# Patient Record
Sex: FEMALE | Race: AMERICAN INDIAN OR ALASKA NATIVE
[De-identification: names, ages, dates, MRNs, and addresses within clinical notes are randomized per-mention and may not be internally consistent; named-entity substitution may affect disease eponyms.]

---

## 2021-01-07 ENCOUNTER — HOSPITAL ENCOUNTER (EMERGENCY)
Dept: HOSPITAL 5 - ED | Age: 51
Discharge: HOME | End: 2021-01-07
Payer: COMMERCIAL

## 2021-01-07 VITALS — SYSTOLIC BLOOD PRESSURE: 117 MMHG | DIASTOLIC BLOOD PRESSURE: 61 MMHG

## 2021-01-07 DIAGNOSIS — R07.89: Primary | ICD-10-CM

## 2021-01-07 DIAGNOSIS — Z79.899: ICD-10-CM

## 2021-01-07 DIAGNOSIS — R51.9: ICD-10-CM

## 2021-01-07 DIAGNOSIS — I10: ICD-10-CM

## 2021-01-07 LAB
ALBUMIN SERPL-MCNC: 4.7 G/DL (ref 3.9–5)
ALT SERPL-CCNC: 28 UNITS/L (ref 7–56)
BASOPHILS # (AUTO): 0 K/MM3 (ref 0–0.1)
BASOPHILS NFR BLD AUTO: 0.9 % (ref 0–1.8)
BILIRUB UR QL STRIP: (no result)
BLOOD UR QL VISUAL: (no result)
BUN SERPL-MCNC: 12 MG/DL (ref 7–17)
BUN/CREAT SERPL: 17 %
CALCIUM SERPL-MCNC: 9.6 MG/DL (ref 8.4–10.2)
EOSINOPHIL # BLD AUTO: 0 K/MM3 (ref 0–0.4)
EOSINOPHIL NFR BLD AUTO: 0.5 % (ref 0–4.3)
HCT VFR BLD CALC: 39 % (ref 30.3–42.9)
HEMOLYSIS INDEX: 4
HGB BLD-MCNC: 13.3 GM/DL (ref 10.1–14.3)
LYMPHOCYTES # BLD AUTO: 1.2 K/MM3 (ref 1.2–5.4)
LYMPHOCYTES NFR BLD AUTO: 32.7 % (ref 13.4–35)
MCHC RBC AUTO-ENTMCNC: 34 % (ref 30–34)
MCV RBC AUTO: 94 FL (ref 79–97)
MONOCYTES # (AUTO): 0.2 K/MM3 (ref 0–0.8)
MONOCYTES % (AUTO): 5.5 % (ref 0–7.3)
MUCOUS THREADS #/AREA URNS HPF: (no result) /HPF
PH UR STRIP: 7 [PH] (ref 5–7)
PLATELET # BLD: 214 K/MM3 (ref 140–440)
PROT UR STRIP-MCNC: (no result) MG/DL
RBC # BLD AUTO: 4.14 M/MM3 (ref 3.65–5.03)
RBC #/AREA URNS HPF: 1 /HPF (ref 0–6)
UROBILINOGEN UR-MCNC: < 2 MG/DL (ref ?–2)
WBC #/AREA URNS HPF: 1 /HPF (ref 0–6)

## 2021-01-07 PROCEDURE — 71046 X-RAY EXAM CHEST 2 VIEWS: CPT

## 2021-01-07 PROCEDURE — 80053 COMPREHEN METABOLIC PANEL: CPT

## 2021-01-07 PROCEDURE — 81001 URINALYSIS AUTO W/SCOPE: CPT

## 2021-01-07 PROCEDURE — 93005 ELECTROCARDIOGRAM TRACING: CPT

## 2021-01-07 PROCEDURE — 84703 CHORIONIC GONADOTROPIN ASSAY: CPT

## 2021-01-07 PROCEDURE — 84484 ASSAY OF TROPONIN QUANT: CPT

## 2021-01-07 PROCEDURE — 36415 COLL VENOUS BLD VENIPUNCTURE: CPT

## 2021-01-07 PROCEDURE — 70450 CT HEAD/BRAIN W/O DYE: CPT

## 2021-01-07 PROCEDURE — 85025 COMPLETE CBC W/AUTO DIFF WBC: CPT

## 2021-01-07 NOTE — EMERGENCY DEPARTMENT REPORT
ED General Adult HPI





- General


Chief complaint: Chest Pain


Stated complaint: CHEST HEAVINESS; HEADACHE; HIGH BLOOD PRESSURE


Time Seen by Provider: 01/07/21 12:02


Source: patient


Mode of arrival: Ambulatory


Limitations: No Limitations





- History of Present Illness


Initial comments: 


Patient is 50 years old female with history of hypertension.  Patient is cu

rrently taking losartan/hydrochlorthiazide 160/25 milligrams.  Patient presented

to the ER with multiple complaints.  Patient stated that she is having headache 

since yesterday comes and goes.  She also reported that she is having chest pain

last night and this morning however it resolved now.  Patient denied any 

weakness, numbness or tingling sensation.  No neck pain, bowel or bladder 

incontinence.  Patient also denied any abdominal pain, nausea or vomiting.  

Patient found to have a blood pressure of 214/129.  Patient stated that she is 

compliant with her medication and actually she showed me blood pressure log with

fluctuating blood pressure.





-: Last night


Location: head, chest


Severity scale (0 -10): 5


Consistency: intermittent





- Related Data


                                  Previous Rx's











 Medication  Instructions  Recorded  Last Taken  Type


 


Valsartan/Hydrochlorothiazide 1 each PO DAILY #30 tablet 12/04/20 Unknown Rx





[Valsartan-Hctz 160-25 mg Tab]    


 


hydrALAZINE [Apresoline TAB] 10 mg PO Q8H #30 tablet 01/07/21 Unknown Rx











                                    Allergies











Allergy/AdvReac Type Severity Reaction Status Date / Time


 


No Known Allergies Allergy   Verified 01/07/21 11:48














ED Review of Systems


ROS: 


Stated complaint: CHEST HEAVINESS; HEADACHE; HIGH BLOOD PRESSURE


Other details as noted in HPI





Comment: All other systems reviewed and negative


Constitutional: denies: chills, fever


Respiratory: denies: cough, orthopnea, shortness of breath, SOB with exertion, 

SOB at rest, wheezing


Cardiovascular: chest pain.  denies: palpitations, dyspnea on exertion, 

orthopnea


Gastrointestinal: denies: abdominal pain, nausea, vomiting


Musculoskeletal: denies: back pain


Neurological: headache.  denies: weakness, numbness, paresthesias, confusion, 

abnormal gait





ED Past Medical Hx





- Past Medical History


Previous Medical History?: No


Hx Hypertension: Yes





- Surgical History


Past Surgical History?: No





- Medications


Home Medications: 


                                Home Medications











 Medication  Instructions  Recorded  Confirmed  Last Taken  Type


 


Valsartan/Hydrochlorothiazide 1 each PO DAILY #30 tablet 12/04/20  Unknown Rx





[Valsartan-Hctz 160-25 mg Tab]     


 


hydrALAZINE [Apresoline TAB] 10 mg PO Q8H #30 tablet 01/07/21  Unknown Rx














ED Physical Exam





- General


Limitations: No Limitations


General appearance: alert, in no apparent distress





- Head


Head exam: Present: atraumatic, normocephalic, normal inspection





- Eye


Eye exam: Present: normal appearance





- ENT


ENT exam: Present: normal exam, normal orophraynx, mucous membranes moist





- Neck


Neck exam: Present: normal inspection, full ROM.  Absent: tenderness, 

meningismus





- Respiratory


Respiratory exam: Present: normal lung sounds bilaterally





- Cardiovascular


Cardiovascular Exam: Present: regular rate, normal rhythm, normal heart sounds





- GI/Abdominal


GI/Abdominal exam: Present: soft, normal bowel sounds.  Absent: distended, 

tenderness, guarding, rebound, rigid, organomegaly, mass, bruit, pulsatile mass,

hernia





- Extremities Exam


Extremities exam: Present: normal inspection, full ROM, normal capillary refill.

 Absent: pedal edema, calf tenderness





- Back Exam


Back exam: Present: normal inspection, full ROM.  Absent: CVA tenderness (R), 

CVA tenderness (L)





- Neurological Exam


Neurological exam: Present: alert, oriented X3, CN II-XII intact, normal gait, 

reflexes normal.  Absent: motor sensory deficit





- Psychiatric


Psychiatric exam: Present: normal mood





- Skin


Skin exam: Present: warm, intact, normal color





ED Course


                                   Vital Signs











  01/07/21 01/07/21 01/07/21





  11:52 16:08 19:47


 


Temperature 97.9 F  


 


Pulse Rate 91 H 79 56 L


 


Respiratory 20  18





Rate   


 


Blood Pressure 214/129 161/96 


 


Blood Pressure   117/61





[Right]   


 


O2 Sat by Pulse 99 97 99





Oximetry   














ED Medical Decision Making





- Lab Data


Result diagrams: 


                                 01/07/21 12:44





                                 01/07/21 12:44





- EKG Data


-: EKG Interpreted by Me


EKG shows normal: sinus rhythm


Rate: normal





- EKG Data


Interpretation: no acute changes





- Radiology Data


Radiology results: report reviewed





- Medical Decision Making


Patient is 50 years old female with history of hypertension.  Patient is 

currently taking losartan/hydrochlorthiazide 160/25 milligrams.  Patient 

presented to the ER with multiple complaints.  Patient stated that she is having

 headache since yesterday comes and goes.  She also reported that she is having 

chest pain last night and this morning however it resolved now.  Patient denied 

any weakness, numbness or tingling sensation.  No neck pain, bowel or bladder 

incontinence.  Patient also denied any abdominal pain, nausea or vomiting.  

Patient found to have a blood pressure of 214/129.  Patient stated that she is 

compliant with her medication and actually she showed me blood pressure log with

 fluctuating blood pressure.





Labs reviewed and is unremarkable.  Patient received clonidine 0.1 mg her blood 

pressure improved significantly.  I added hydralazine 10 mg to her current 

regimen and advised to follow-up with her primary doctor in the next 2 to 3 days

 and to return to the ER if she develop any new symptoms.














Critical care attestation.: 


If time is entered above; I have spent that time in minutes in the direct care 

of this critically ill patient, excluding procedure time.








ED Disposition


Clinical Impression: 


 Chest pain, Malignant hypertension





Disposition: DC-01 TO HOME OR SELFCARE


Is pt being admited?: No


Condition: Stable


Instructions:  Nonspecific Chest Pain, Adult, Hypertension, Adult, Easy-to-Read,

 Chest Pain (ED), Hypertension (ED)


Prescriptions: 


hydrALAZINE [Apresoline TAB] 10 mg PO Q8H #30 tablet


Referrals: 


[facility],MD [Primary Care Provider] - 3-5 Days

## 2021-01-07 NOTE — EVENT NOTE
ED Screening Note


ED Screening Note: 








had her BP medication increased on 12/4/2020 in the ED 


states she has an appointment tomorrow with a PCP


states she took her BP this morning at 9 AM


states she has headaches intermittently for a month


no vision changes 


states began having chest heaviness yesterday  


no SOB


no n/v


no diaphoresis


no numbness 


no weakness 





This initial assessment/diagnostic orders/clinical plan/treatment(s) is/are 

subject to change based on patients health status, clinical progression and re-

assessment by fellow clinical providers in the ED. Further treatment and workup 

at subsequent clinical providers discretion. Patient/guardian urged not to elope

from the ED as their condition may be serious if not clinically assessed and 

managed. 





Initial orders include: 


CP protocol, CT head

## 2021-01-07 NOTE — CAT SCAN REPORT
CT head/brain wo con



INDICATION / CLINICAL INFORMATION:

50 years Female; HA, HTN urgency.



TECHNIQUE: Routine CT head without contrast. All CT scans at this location are performed using CT dos
e reduction for ALARA by means of automated exposure control.



COMPARISON: 

None.



FINDINGS:



BRAIN / INTRACRANIAL CONTENTS: No acute hemorrhage, mass effect, midline shift, hydrocephalus, or acu
te, large territorial infarct. No signs of significant atrophy or chronic infarct. No significant whi
te matter abnormality seen.



CRANIOCERVICAL JUNCTION: No significant abnormality.



ORBITS: No significant abnormality of visualized orbits.

SINUSES / MASTOIDS: Visualized paranasal sinuses and mastoid air cells are essentially clear.



ADDITIONAL FINDINGS: None. 



IMPRESSION:

1. No focal mass, hemorrhage, hydrocephalus, or acute, large territorial infarct. 



Signer Name: Herbie Moreno MD, III 

Signed: 1/7/2021 2:16 PM

Workstation Name: [id number]

## 2021-01-07 NOTE — XRAY REPORT
CHEST 2 VIEWS 



INDICATION / CLINICAL INFORMATION:

Chest Pain.



COMPARISON: 

None available.



FINDINGS:



SUPPORT DEVICES: None.



HEART / MEDIASTINUM: No significant abnormality. 



LUNGS / PLEURA: No significant pulmonary or pleural abnormality. No pneumothorax. 



ADDITIONAL FINDINGS: No significant additional findings.



IMPRESSION:

No acute cardiopulmonary abnormality.



Signer Name: Alvin Traylor MD 

Signed: 1/7/2021 12:38 PM

Workstation Name: [id number]

## 2021-01-10 ENCOUNTER — HOSPITAL ENCOUNTER (EMERGENCY)
Dept: HOSPITAL 5 - ED | Age: 51
LOS: 1 days | Discharge: HOME | End: 2021-01-11
Payer: COMMERCIAL

## 2021-01-10 DIAGNOSIS — Z79.899: ICD-10-CM

## 2021-01-10 DIAGNOSIS — I10: ICD-10-CM

## 2021-01-10 DIAGNOSIS — R51.9: ICD-10-CM

## 2021-01-10 DIAGNOSIS — R07.89: Primary | ICD-10-CM

## 2021-01-10 LAB
ALBUMIN SERPL-MCNC: 4.6 G/DL (ref 3.9–5)
ALT SERPL-CCNC: 22 UNITS/L (ref 7–56)
BASOPHILS # (AUTO): 0 K/MM3 (ref 0–0.1)
BASOPHILS NFR BLD AUTO: 0.6 % (ref 0–1.8)
BUN SERPL-MCNC: 12 MG/DL (ref 7–17)
BUN/CREAT SERPL: 17 %
CALCIUM SERPL-MCNC: 9.6 MG/DL (ref 8.4–10.2)
EOSINOPHIL # BLD AUTO: 0.1 K/MM3 (ref 0–0.4)
EOSINOPHIL NFR BLD AUTO: 1.1 % (ref 0–4.3)
HCT VFR BLD CALC: 37.7 % (ref 30.3–42.9)
HEMOLYSIS INDEX: 9
HGB BLD-MCNC: 13 GM/DL (ref 10.1–14.3)
LYMPHOCYTES # BLD AUTO: 2.1 K/MM3 (ref 1.2–5.4)
LYMPHOCYTES NFR BLD AUTO: 37.4 % (ref 13.4–35)
MCHC RBC AUTO-ENTMCNC: 35 % (ref 30–34)
MCV RBC AUTO: 93 FL (ref 79–97)
MONOCYTES # (AUTO): 0.5 K/MM3 (ref 0–0.8)
MONOCYTES % (AUTO): 8.9 % (ref 0–7.3)
PLATELET # BLD: 213 K/MM3 (ref 140–440)
RBC # BLD AUTO: 4.04 M/MM3 (ref 3.65–5.03)

## 2021-01-10 PROCEDURE — 84484 ASSAY OF TROPONIN QUANT: CPT

## 2021-01-10 PROCEDURE — 93005 ELECTROCARDIOGRAM TRACING: CPT

## 2021-01-10 PROCEDURE — 36415 COLL VENOUS BLD VENIPUNCTURE: CPT

## 2021-01-10 PROCEDURE — 85025 COMPLETE CBC W/AUTO DIFF WBC: CPT

## 2021-01-10 PROCEDURE — 71046 X-RAY EXAM CHEST 2 VIEWS: CPT

## 2021-01-10 PROCEDURE — 80053 COMPREHEN METABOLIC PANEL: CPT

## 2021-01-10 NOTE — XRAY REPORT
CHEST 2 VIEWS 



INDICATION: 

Chest Pain.



COMPARISON: 

1/7/2021.



FINDINGS:

Support devices: None.



Heart: Within normal limits. 

Lungs/Pleura: No acute air space or interstitial disease.   No significant pleural effusion. 



IMPRESSION:  No acute findings.

 



Signer Name: Venkat Dominguez MD 

Signed: 1/10/2021 9:56 PM

Workstation Name: [id number]

## 2021-01-10 NOTE — EMERGENCY DEPARTMENT REPORT
ED Chest Pain HPI





- General


Chief Complaint: Chest Pain


Stated Complaint: CHEST PAIN,HEADACHE


Time Seen by Provider: 01/10/21 23:31


Source: patient


Mode of arrival: Ambulatory


Limitations: No Limitations





- History of Present Illness


Initial Comments: 





50-year-old female, recently diagnosed with hypertension last month, presents to

ED with chest pain, headache, elevated blood pressure.  Patient was seen 3 days 

ago for same.  Had negative work-up including troponin, EKG, CT head.  During 

that visit she was given a prescription for hydralazine, which she has been 

taking.  Patient states her symptoms began 1 month ago, early December, with a 

headache.  She was seen here in our ED and prescribed valsartan, HCTZ.  Patient 

states prior to last month, she did not have a diagnosis of hypertension, 

however, she also did not have a PCP, and was not seeing any physician 

regularly.  Patient states this evening, she was just sitting around and she 

began to have some heaviness in her chest along with a headache.  She reports 

the chest pain lasts no more than 5 minutes.  Patient denies any nausea, 

vomiting, shortness of breath, diaphoresis, leg pain or swelling.  Patient 

states when she began having the headache and chest pain, she took her blood 

pressure and it was elevated.  Patient has a home BP device and has been checks 

her blood pressure many times throughout the day.  Patient's first blood 

pressure when she woke up this morning was 129/80.  She states whenever she 

feels the slightest headache or chest discomfort, she will take her blood pr

essure.  Patient states that she will then drink water to see if it will improve

her BP and then check her blood pressure again 30 minutes later.  Patient states

"sometimes I check my blood pressure so many times I cannot even count ".  

Patient's blood pressure gets progressively higher as she checks it throughout 

the day.  She denies anxiety.  Patient is currently in nursing school and 

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  She reports her chest pain has currently 

resolved.


MD Complaint: chest pain


-: This evening


Onset: during rest


Pain Location: substernal


Pain Radiation: none


Severity: moderate


Quality: heaviness


Consistency: intermittent


Improves With: nothing


Worsens With: nothing


re: denies: nausea, vomting, diaphoresis, dyspnea


Other Symptoms: denies: cough, fever, leg swelling





- Related Data


                                  Previous Rx's











 Medication  Instructions  Recorded  Last Taken  Type


 


Valsartan/Hydrochlorothiazide 1 each PO DAILY #30 tablet 12/04/20 Unknown Rx





[Valsartan-Hctz 160-25 mg Tab]    


 


hydrALAZINE [Apresoline TAB] 10 mg PO Q8H #30 tablet 01/07/21 Unknown Rx











                                    Allergies











Allergy/AdvReac Type Severity Reaction Status Date / Time


 


No Known Allergies Allergy   Verified 01/07/21 11:48














Heart Score





- HEART Score


History: Slightly suspicious


EKG: Normal


Age: 45-65


Risk factors: 1-2 risk factors


Troponin: < normal limit


HEART Score: 2





ED Review of Systems


ROS: 


Stated complaint: CHEST PAIN,HEADACHE


Other details as noted in HPI





Comment: All other systems reviewed and negative


Constitutional: denies: chills, fever


Respiratory: denies: cough, shortness of breath


Cardiovascular: chest pain


Gastrointestinal: denies: nausea, vomiting





ED Past Medical Hx





- Past Medical History


Previous Medical History?: Yes


Hx Hypertension: Yes





- Surgical History


Past Surgical History?: No





- Social History


Smoking Status: Never Smoker


Substance Use Type: None





- Medications


Home Medications: 


                                Home Medications











 Medication  Instructions  Recorded  Confirmed  Last Taken  Type


 


Valsartan/Hydrochlorothiazide 1 each PO DAILY #30 tablet 12/04/20  Unknown Rx





[Valsartan-Hctz 160-25 mg Tab]     


 


hydrALAZINE [Apresoline TAB] 10 mg PO Q8H #30 tablet 01/07/21  Unknown Rx














ED Physical Exam





- General


Limitations: No Limitations


General appearance: alert, in no apparent distress





- Head


Head exam: Present: atraumatic, normocephalic





- Eye


Eye exam: Present: normal appearance





- ENT


ENT exam: Present: mucous membranes moist





- Neck


Neck exam: Present: normal inspection





- Respiratory


Respiratory exam: Present: normal lung sounds bilaterally.  Absent: respiratory 

distress





- Cardiovascular


Cardiovascular Exam: Present: regular rate, normal rhythm





- GI/Abdominal


GI/Abdominal exam: Present: soft.  Absent: distended, tenderness





- Extremities Exam


Extremities exam: Present: normal inspection





- Neurological Exam


Neurological exam: Present: alert, oriented X3





- Psychiatric


Psychiatric exam: Present: normal affect, normal mood





- Skin


Skin exam: Present: warm, dry, intact, normal color





ED Course


                                   Vital Signs











  01/10/21 01/10/21 01/11/21





  20:57 23:33 00:00


 


Temperature 98.5 F  


 


Pulse Rate 74  


 


Respiratory 16  





Rate   


 


Blood Pressure 180/110  


 


Blood Pressure  229/113 184/94





[Left]   


 


O2 Sat by Pulse 100  





Oximetry   














  01/11/21 01/11/21 01/11/21





  00:23 00:25 00:30


 


Temperature   


 


Pulse Rate  60 64


 


Respiratory 16 20 13





Rate   


 


Blood Pressure   153/88


 


Blood Pressure  142/80 





[Left]   


 


O2 Sat by Pulse 100 98 99





Oximetry   














ED Medical Decision Making





- Lab Data


Result diagrams: 


                                 01/10/21 21:59





                                 01/10/21 21:59





- EKG Data


-: EKG Interpreted by Me


EKG shows normal: sinus rhythm, axis, intervals, QRS complexes, ST-T waves


Rate: normal





- EKG Data


Interpretation: no acute changes





- Radiology Data


Radiology results: report reviewed, image reviewed





- Medical Decision Making





50-year-old female presents to ED with elevated blood pressure, chest pain, 

headache.  Patient was seen for same 3 days ago and had a negative work-up at 

that time.  Patient is compliant with her blood pressure medications, however I 

believe her symptoms may be related to anxiety.  Patient had a blood pressure of

 229/113 when I initially saw her.  Patient declined any anxiety medication, 

however I instructed her to try and calm down and try to relax.  Repeat blood 

pressure was much improved, 142/80, without any medication given here in the ED.

  EKG shows no ST changes, troponin is negative.  Patient also had 2 negative 

troponins 3 days ago.  I will not make any changes in patient's prescriptions.  

She has a follow-up appointment with her PCP in 4 days.  [facility]ation to [place] 

[facility] so that she will have urgent cardiology follow-up.  

Patient is symptom-free at this time, will discharge home.  Return precautions 

given.





- Differential Diagnosis


anxiety, HTN, ACS


Critical care attestation.: 


If time is entered above; I have spent that time in minutes in the direct care 

of this critically ill patient, excluding procedure time.








ED Disposition


Clinical Impression: 


 Chest pain, Hypertension





Disposition: DC-01 TO HOME OR SELFCARE


Is pt being admited?: No


Condition: Stable


Instructions:  Nonspecific Chest Pain, Adult, Easy-to-Read, Managing Anxiety, 

Adult, Chest Pain (ED), Hypertension (ED), Hypertension, Adult, Easy-to-Read


Referrals: 


[facility], MD [Primary Care Provider] - 3-5 Days


Time of Disposition: 00:43

## 2021-01-11 VITALS — SYSTOLIC BLOOD PRESSURE: 153 MMHG | DIASTOLIC BLOOD PRESSURE: 88 MMHG
